# Patient Record
Sex: MALE | Race: WHITE | Employment: FULL TIME | URBAN - METROPOLITAN AREA
[De-identification: names, ages, dates, MRNs, and addresses within clinical notes are randomized per-mention and may not be internally consistent; named-entity substitution may affect disease eponyms.]

---

## 2021-06-16 ENCOUNTER — HOSPITAL ENCOUNTER (EMERGENCY)
Age: 26
Discharge: HOME | End: 2021-06-16
Attending: EMERGENCY MEDICINE
Payer: COMMERCIAL

## 2021-06-16 VITALS
HEART RATE: 107 BPM | WEIGHT: 170 LBS | BODY MASS INDEX: 25.18 KG/M2 | TEMPERATURE: 98.1 F | OXYGEN SATURATION: 99 % | HEIGHT: 69 IN | DIASTOLIC BLOOD PRESSURE: 107 MMHG | RESPIRATION RATE: 18 BRPM | SYSTOLIC BLOOD PRESSURE: 166 MMHG

## 2021-06-16 DIAGNOSIS — T30.0 BURN: ICD-10-CM

## 2021-06-16 DIAGNOSIS — T25.221A PARTIAL THICKNESS BURN OF RIGHT FOOT, INITIAL ENCOUNTER: Primary | ICD-10-CM

## 2021-06-16 PROCEDURE — 99283 EMERGENCY DEPT VISIT LOW MDM: CPT

## 2021-06-16 PROCEDURE — 90715 TDAP VACCINE 7 YRS/> IM: CPT | Performed by: EMERGENCY MEDICINE

## 2021-06-16 PROCEDURE — 2500000003 HC RX 250 WO HCPCS: Performed by: EMERGENCY MEDICINE

## 2021-06-16 PROCEDURE — 90471 IMMUNIZATION ADMIN: CPT | Performed by: EMERGENCY MEDICINE

## 2021-06-16 PROCEDURE — 6360000002 HC RX W HCPCS: Performed by: EMERGENCY MEDICINE

## 2021-06-16 RX ORDER — HYDROCODONE BITARTRATE AND ACETAMINOPHEN 5; 325 MG/1; MG/1
1 TABLET ORAL EVERY 6 HOURS PRN
Qty: 10 TABLET | Refills: 0 | Status: SHIPPED | OUTPATIENT
Start: 2021-06-16 | End: 2021-06-21

## 2021-06-16 RX ADMIN — SILVER SULFADIAZINE: 10 CREAM TOPICAL at 08:39

## 2021-06-16 RX ADMIN — TETANUS TOXOID, REDUCED DIPHTHERIA TOXOID AND ACELLULAR PERTUSSIS VACCINE, ADSORBED 0.5 ML: 5; 2.5; 8; 8; 2.5 SUSPENSION INTRAMUSCULAR at 08:39

## 2021-06-16 ASSESSMENT — PAIN DESCRIPTION - PAIN TYPE: TYPE: ACUTE PAIN

## 2021-06-16 ASSESSMENT — ENCOUNTER SYMPTOMS
RHINORRHEA: 0
CHEST TIGHTNESS: 0
BACK PAIN: 0
ABDOMINAL PAIN: 0

## 2021-06-16 ASSESSMENT — PAIN DESCRIPTION - DESCRIPTORS: DESCRIPTORS: BURNING

## 2021-06-16 ASSESSMENT — PAIN DESCRIPTION - ORIENTATION: ORIENTATION: LEFT;RIGHT

## 2021-06-16 ASSESSMENT — PAIN DESCRIPTION - LOCATION: LOCATION: FOOT

## 2021-06-16 ASSESSMENT — PAIN SCALES - GENERAL: PAINLEVEL_OUTOF10: 7

## 2021-06-16 NOTE — ED NOTES
Bed: 16  Expected date:   Expected time:   Means of arrival:   Comments:  [address] RN  06/16/21 0577

## 2021-06-16 NOTE — ED PROVIDER NOTES
[facility]  EMERGENCY DEPARTMENT ENCOUNTER      PtName: Gagan Arroyo  MRN: [id number]  [place] 1995  Date of evaluation: 6/16/21      CHIEF COMPLAINT       Chief Complaint   Patient presents with    Foot Burn     pt states alex fell on his feet while at work         HISTORY OF PRESENT ILLNESS (4 or more for level 4 or 5)    Gagan Arroyo is a 30 y.o. male who presents for him. The patient dropped hot oil onto his right foot. The oil went down his sock into his foot he immediately took off his shoes socks and pants. Patient is unsure of his last tetanus immunization. The patient states he did occur while at work. He said the strap that was holding the oral container broke. EMS was called they did give him 150 mcg of fentanyl IV with some relief of his pain. This patient was evaluated in the Emergency Department for symptoms described in the history of present illness. This patient was evaluated in the context of the global COVID-19 pandemic, which necessitated consideration that the patient might be at risk for infection with the SARS-CoV-2 virus that causes COVID-19. Institutional protocols and algorithms that pertain to the evaluation of patients at risk for COVID-19 are in a state of rapid change based on information released by regulatory bodies including the CDC and federal and state organizations. These policies and algorithms were followed during the patient's care in the ED. REVIEW OF SYSTEMS  (2-9 for Level 4, 10 or more Level 5)     Review of Systems   Constitutional: Negative for fever. HENT: Negative for rhinorrhea. Respiratory: Negative for chest tightness. Cardiovascular: Negative for chest pain. Gastrointestinal: Negative for abdominal pain. Musculoskeletal: Negative for back pain. Neurological: Negative for dizziness and headaches.        PAST MEDICAL HISTORY PAST SURGICAL HISTORY (1 for Level 4, 2 for Level 5)   Past medical history: Denies Past surgical history: Denies      CURRENT MEDICATIONS       Medication: None    ALLERGIES     has No Known Allergies. FAMILY HISTORY     has no family status information on file. family history is not on file. SOCIAL HISTORY      reports that he has never smoked. He does not have any smokeless tobacco history on file. He reports previous alcohol use. He reports that he does not use drugs. PHYSICAL EXAM  (5-7 for level 4, 8 or more level 5)   INITIAL VITALS:  height is 5' 9\" (1.753 m) and weight is 170 lb (77.1 kg). His oral temperature is 98.1 °F (36.7 °C). His blood pressure is 166/107 (abnormal) and his pulse is 107. His respiration is 18 and oxygen saturation is 99%. Physical Exam  Vitals reviewed. Constitutional:       Appearance: He is well-developed. HENT:      Head: Atraumatic. Eyes:      General:         Right eye: No discharge. Left eye: No discharge. Conjunctiva/sclera: Conjunctivae normal.   Neck:      Trachea: No tracheal deviation. Cardiovascular:      Rate and Rhythm: Regular rhythm. Tachycardia present. Pulmonary:      Effort: Pulmonary effort is normal.      Breath sounds: Normal breath sounds. Abdominal:      Palpations: Abdomen is soft. Musculoskeletal:      Cervical back: Normal range of motion. Skin:     General: Skin is warm and dry. Comments: Second-degree burns with some blistering noted on the medial aspect of the right foot. It is noncircumferential does not involve the plantar aspect of the foot or the dorsal aspect of the foot. Pedal pulses 2/4. Small area of erythema consistent with a first-degree burn on the lateral aspect of the left leg. Neurological:      Mental Status: He is alert and oriented to person, place, and time.    Psychiatric:         Behavior: Behavior normal.                   DIFFERENTIAL DIAGNOSIS/ MDM:     First and second-degree burns    DIAGNOSTIC RESULTS       EMERGENCY DEPARTMENTCOURSE:   Vitals:    Vitals: 06/16/21 0804 06/16/21 0807 06/16/21 0808   BP: (!) 166/107     Pulse: 107     Resp: 18     Temp:   98.1 °F (36.7 °C)   TempSrc:   Oral   SpO2: 99%     Weight:  170 lb (77.1 kg)    Height:  5' 9\" (1.753 m)      -------------------------  BP: (!) 166/107, Temp: 98.1 °F (36.7 °C), Pulse: 107, Resp: 18    Tetanus immunization will be updated. The patient will be given analgesia. The wound to be cleaned and dressed with Silvadene cream.  The occupational health paperwork will be completed. ED Course as of Jun 16 1018   Wed Jun 16, 2021   1008 I reevaluated the patient. At this time is feeling comfortable. I did discuss with him his discharge instructions and need for follow-up at occupational health tomorrow. He verbalized understanding of these discharge instructions. [GH]      ED Course User Index  [GH] Violet Styles MD           FINALIMPRESSION      1. Partial thickness burn of right foot, initial encounter    2. Burn          DISPOSITION/PLAN   DISPOSITION    Discharged home    PATIENT REFERRED TO:  [place] [address] [place] [address]  [phone]  Call today  For wound re-check, tomorrow      DISCHARGE MEDICATIONS:  New Prescriptions    HYDROCODONE-ACETAMINOPHEN (NORCO) 5-325 MG PER TABLET    Take 1 tablet by mouth every 6 hours as needed for Pain for up to 5 days. SILVER SULFADIAZINE (SILVADENE) 1 % CREAM    Apply topically daily. (Please note that portions of this note were completed with a voice recognitionprogram.  Efforts were made to edit the dictations but occasionally words are mis-transcribed.)    Violet Styles MD F.A.C.E.P.   Attending Emergency Physician                   Violet Styles MD  06/16/21 1018

## 2021-06-16 NOTE — ED NOTES
Pt reports to the ED via EMS with c/o Burn. Pt states he was at work when he was carrying a grease bucket and the strap broke and went on his Lt foot and small amount on his Lt leg. Pt was given 150mcg of fentanyl PTA by ems. Pt is A&Ox4, RR even and non labored.        Chantel Espinoza RN  06/16/21 5985